# Patient Record
Sex: MALE | Race: WHITE | Employment: OTHER | URBAN - METROPOLITAN AREA
[De-identification: names, ages, dates, MRNs, and addresses within clinical notes are randomized per-mention and may not be internally consistent; named-entity substitution may affect disease eponyms.]

---

## 2019-04-11 ENCOUNTER — OFFICE VISIT (OUTPATIENT)
Dept: FAMILY MEDICINE CLINIC | Age: 84
End: 2019-04-11
Payer: MEDICARE

## 2019-04-11 VITALS
HEIGHT: 72 IN | RESPIRATION RATE: 16 BRPM | WEIGHT: 247 LBS | BODY MASS INDEX: 33.46 KG/M2 | TEMPERATURE: 98.1 F | SYSTOLIC BLOOD PRESSURE: 130 MMHG | DIASTOLIC BLOOD PRESSURE: 78 MMHG

## 2019-04-11 DIAGNOSIS — G89.29 CHRONIC PAIN OF BOTH KNEES: Primary | ICD-10-CM

## 2019-04-11 DIAGNOSIS — M25.562 CHRONIC PAIN OF BOTH KNEES: Primary | ICD-10-CM

## 2019-04-11 DIAGNOSIS — M79.643 CHRONIC HAND PAIN, UNSPECIFIED LATERALITY: ICD-10-CM

## 2019-04-11 DIAGNOSIS — G89.29 CHRONIC HAND PAIN, UNSPECIFIED LATERALITY: ICD-10-CM

## 2019-04-11 DIAGNOSIS — M25.561 CHRONIC PAIN OF BOTH KNEES: Primary | ICD-10-CM

## 2019-04-11 PROCEDURE — 99387 INIT PM E/M NEW PAT 65+ YRS: CPT | Performed by: FAMILY MEDICINE

## 2019-04-11 RX ORDER — AMPICILLIN TRIHYDRATE 250 MG
CAPSULE ORAL
COMMUNITY
End: 2019-06-07 | Stop reason: ALTCHOICE

## 2019-04-11 RX ORDER — METHYLPREDNISOLONE 4 MG/1
4 TABLET ORAL SEE ADMIN INSTRUCTIONS
COMMUNITY
End: 2019-06-07 | Stop reason: ALTCHOICE

## 2019-04-11 RX ORDER — MULTIVITAMIN
1 CAPSULE ORAL DAILY
COMMUNITY

## 2019-04-11 RX ORDER — ATORVASTATIN CALCIUM 80 MG/1
80 TABLET, FILM COATED ORAL DAILY
COMMUNITY
End: 2019-08-15 | Stop reason: SDUPTHER

## 2019-04-11 ASSESSMENT — PATIENT HEALTH QUESTIONNAIRE - PHQ9
1. LITTLE INTEREST OR PLEASURE IN DOING THINGS: 0
SUM OF ALL RESPONSES TO PHQ QUESTIONS 1-9: 0
2. FEELING DOWN, DEPRESSED OR HOPELESS: 0
SUM OF ALL RESPONSES TO PHQ9 QUESTIONS 1 & 2: 0
SUM OF ALL RESPONSES TO PHQ QUESTIONS 1-9: 0

## 2019-04-11 NOTE — PROGRESS NOTES
4/11/2019    This is a 80 y.o. male   Chief Complaint   Patient presents with   [address]   .    HPI  Pt presents today as a new pt to establish a PCP. Currently taking 1/2 of his Lipitor 80 mg. No longer taking medrol dose pack, was prescribed for acute neck pain. Past Medical History:   Diagnosis Date    Herniated cervical disc unknown       Past Surgical History:   Procedure Laterality Date    PACEMAKER INSERTION  unknown       Social History     Socioeconomic History    Marital status:      Spouse name: Not on file    Number of children: Not on file    Years of education: Not on file    Highest education level: Not on file   Occupational History    Not on file   Social Needs    Financial resource strain: Not on file    Food insecurity:     Worry: Not on file     Inability: Not on file    Transportation needs:     Medical: Not on file     Non-medical: Not on file   Tobacco Use    Smoking status: Never Smoker    Smokeless tobacco: Never Used   Substance and Sexual Activity    Alcohol use: Yes     Alcohol/week: 0.6 oz     Types: 1 Cans of beer per week     Comment: drinks every day @ 4pm    Drug use: Never    Sexual activity: Not on file   Lifestyle    Physical activity:     Days per week: Not on file     Minutes per session: Not on file    Stress: Not on file   Relationships    Social connections:     Talks on phone: Not on file     Gets together: Not on file     Attends Restorationism service: Not on file     Active member of club or organization: Not on file     Attends meetings of clubs or organizations: Not on file     Relationship status: Not on file    Intimate partner violence:     Fear of current or ex partner: Not on file     Emotionally abused: Not on file     Physically abused: Not on file     Forced sexual activity: Not on file   Other Topics Concern    Not on file   Social History Narrative    Not on file       History reviewed.  No pertinent Medications    Medication Sig Taking? Authorizing Provider   atorvastatin (LIPITOR) 80 MG tablet Take 80 mg by mouth daily Yes Historical Provider, MD   methylPREDNISolone (MEDROL DOSEPACK) 4 MG tablet Take 4 mg by mouth See Admin Instructions Take by mouth.  Yes Historical Provider, MD   metoprolol tartrate (LOPRESSOR) 25 MG tablet Take 25 mg by mouth 2 times daily Yes Historical Provider, MD   Chondroitin Sulfate 150 MG CAPS Take by mouth Yes Historical Provider, MD   Cinnamon 500 MG CAPS Take by mouth Yes Historical Provider, MD   Multiple Vitamin (MULTIVITAMIN) capsule Take 1 capsule by mouth daily Yes Historical Provider, MD

## 2019-05-29 ENCOUNTER — TELEPHONE (OUTPATIENT)
Dept: FAMILY MEDICINE CLINIC | Age: 84
End: 2019-05-29

## 2019-05-29 NOTE — TELEPHONE ENCOUNTER
[facility] to let Dr. Marlon Harp know that they are starting patient Occupational Therapy For  2 xs  a week For 3 weeks.

## 2019-06-07 ENCOUNTER — OFFICE VISIT (OUTPATIENT)
Dept: FAMILY MEDICINE CLINIC | Age: 84
End: 2019-06-07
Payer: MEDICARE

## 2019-06-07 VITALS
DIASTOLIC BLOOD PRESSURE: 70 MMHG | HEIGHT: 72 IN | BODY MASS INDEX: 32.51 KG/M2 | WEIGHT: 240 LBS | RESPIRATION RATE: 16 BRPM | OXYGEN SATURATION: 94 % | TEMPERATURE: 98.4 F | HEART RATE: 72 BPM | SYSTOLIC BLOOD PRESSURE: 136 MMHG

## 2019-06-07 DIAGNOSIS — I63.9 CEREBROVASCULAR ACCIDENT (CVA), UNSPECIFIED MECHANISM (HCC): Primary | ICD-10-CM

## 2019-06-07 DIAGNOSIS — Z09 HOSPITAL DISCHARGE FOLLOW-UP: ICD-10-CM

## 2019-06-07 PROCEDURE — 1111F DSCHRG MED/CURRENT MED MERGE: CPT | Performed by: FAMILY MEDICINE

## 2019-06-07 PROCEDURE — 99214 OFFICE O/P EST MOD 30 MIN: CPT | Performed by: FAMILY MEDICINE

## 2019-06-07 RX ORDER — TAMSULOSIN HYDROCHLORIDE 0.4 MG/1
0.4 CAPSULE ORAL
COMMUNITY
Start: 2019-05-07 | End: 2019-07-22 | Stop reason: SDUPTHER

## 2019-06-07 RX ORDER — AMOXICILLIN 250 MG
2 CAPSULE ORAL
COMMUNITY

## 2019-06-07 RX ORDER — CHLORAL HYDRATE 500 MG
1000 CAPSULE ORAL
COMMUNITY
Start: 2019-05-23 | End: 2021-03-30 | Stop reason: ALTCHOICE

## 2019-06-07 RX ORDER — CLOPIDOGREL BISULFATE 75 MG/1
TABLET ORAL
COMMUNITY
Start: 2019-05-23 | End: 2019-07-22 | Stop reason: SDUPTHER

## 2019-06-07 ASSESSMENT — ENCOUNTER SYMPTOMS: ABDOMINAL PAIN: 1

## 2019-06-07 NOTE — PROGRESS NOTES
6/7/2019    This is a 80 y.o. male   Chief Complaint   Patient presents with    Follow-Up from Hospital     Released from [address] [place] [address] on 5/7/2019 from having a stroke; did go to rehab and released on 5/23/2019   . HPI  Patient presents today for follow-up from skilled nursing. He was hospitalized from May 1 to May 7 for stroke. He then went to rehabilitation from May 7th and was discharged on May 23, 2019 from [address] [place] [address]. Pt states that he feels well, no previous episodes. Pt's  Daughter in law states that there was no neurology consult while pt was in the Hospital nor rehab. Had Speech/PT/OT therapy while in rehab. Pt states that cardiac event was r/o in the hospital. Dru Moores to some memory issues but denies concentration issue, also admit to improving fatigue,j oint pain, and muscle pain in the buttocks and abdomen, pt was alone at the time of the CVA and does recall any sx before the CVA, was found 1 1/2 days by family. Pt's daughter in law states that he will have a [employer] and cameras in the house. Denies dizziness, HA's, or lightheadedness, or concentration. Past Medical History:   Diagnosis Date    Herniated cervical disc unknown       Past Surgical History:   Procedure Laterality Date    PACEMAKER INSERTION  unknown       Social History     Socioeconomic History    Marital status:      Spouse name: Not on file    Number of children: Not on file    Years of education: Not on file    Highest education level: Not on file   Occupational History    Not on file   Social Needs    Financial resource strain: Not on file    Food insecurity:     Worry: Not on file     Inability: Not on file    Transportation needs:     Medical: Not on file     Non-medical: Not on file   Tobacco Use    Smoking status: Never Smoker    Smokeless tobacco: Never Used   Substance and Sexual Activity    Alcohol use:  Yes     Alcohol/week: 0.6 oz     Types: 1 Cans of beer per week     Comment: drinks every day @ 4pm    Drug use: Never    Sexual activity: Not on file   Lifestyle    Physical activity:     Days per week: Not on file     Minutes per session: Not on file    Stress: Not on file   Relationships    Social connections:     Talks on phone: Not on file     Gets together: Not on file     Attends Zoroastrianism service: Not on file     Active member of club or organization: Not on file     Attends meetings of clubs or organizations: Not on file     Relationship status: Not on file    Intimate partner violence:     Fear of current or ex partner: Not on file     Emotionally abused: Not on file     Physically abused: Not on file     Forced sexual activity: Not on file   Other Topics Concern    Not on file   Social History Narrative    Not on file       History reviewed. No pertinent family history. Current Outpatient Medications   Medication Sig Dispense Refill    clopidogrel (PLAVIX) 75 MG tablet       Omega-3 Fatty Acids (FISH OIL) 1000 MG CAPS Take 1,000 mg by mouth      tamsulosin (FLOMAX) 0.4 MG capsule Take 0.4 mg by mouth      metoprolol tartrate (LOPRESSOR) 25 MG tablet Take 25 mg by mouth      Glucosamine-Chondroitin 250-200 MG TABS Take 2 tablets by mouth      senna-docusate (PERICOLACE) 8.6-50 MG per tablet Take 2 tablets by mouth      atorvastatin (LIPITOR) 80 MG tablet Take 80 mg by mouth daily      metoprolol tartrate (LOPRESSOR) 25 MG tablet Take 25 mg by mouth 2 times daily      Chondroitin Sulfate 150 MG CAPS Take by mouth      Multiple Vitamin (MULTIVITAMIN) capsule Take 1 capsule by mouth daily       No current facility-administered medications for this visit. There is no immunization history on file for this patient. No Known Allergies    No results found for any previous visit. Review of Systems   Constitutional: Positive for fatigue (improved). Gastrointestinal: Positive for abdominal pain (improving).    Musculoskeletal: Positive for arthralgias (improving) and myalgias (improving). Neurological: Negative for dizziness, weakness, light-headedness and headaches. Psychiatric/Behavioral: Negative for confusion and decreased concentration. /70 (Site: Left Upper Arm, Position: Sitting, Cuff Size: Medium Adult)   Pulse 72   Temp 98.4 °F (36.9 °C) (Oral)   Resp 16   Ht 6' (1.829 m)   Wt 240 lb (108.9 kg)   SpO2 94%   BMI 32.55 kg/m²     Physical Exam   Constitutional: He is oriented to person, place, and time. He appears well-developed and well-nourished. No distress. HENT:   Head: Normocephalic and atraumatic. Eyes: Pupils are equal, round, and reactive to light. EOM are normal.   Neck: Normal range of motion. Cardiovascular: Normal rate, regular rhythm and normal heart sounds. Pulmonary/Chest: Effort normal and breath sounds normal. He has no wheezes. Abdominal: Bowel sounds are normal. There is no tenderness. Neurological: He is alert and oriented to person, place, and time. No cranial nerve deficit or sensory deficit. Coordination normal.   Psychiatric: He has a normal mood and affect. His behavior is normal. Judgment and thought content normal.   Vitals reviewed. Plan   Diagnosis Orders   1. Cerebrovascular accident (CVA), unspecified mechanism ([facility] [address]  Sheri 5, [facility]   2. Hospital discharge follow-up  [address] meds as prescribed. Return in about 2 months (around 8/7/2019) for CVA/Neurology F/U. Prior to Visit Medications    Medication Sig Taking?  Authorizing Provider   clopidogrel (PLAVIX) 75 MG tablet  Yes Historical Provider, MD   Omega-3 Fatty Acids (FISH OIL) 1000 MG CAPS Take 1,000 mg by mouth Yes Historical Provider, MD   tamsulosin (FLOMAX) 0.4 MG capsule Take 0.4 mg by mouth Yes Historical Provider, MD   metoprolol tartrate (LOPRESSOR) 25 MG tablet Take 25 mg by mouth Yes Historical Provider, MD Glucosamine-Chondroitin 250-200 MG TABS Take 2 tablets by mouth Yes Historical Provider, MD   senna-docusate ([place]) 8.6-50 MG per tablet Take 2 tablets by mouth Yes Historical Provider, MD   atorvastatin (LIPITOR) 80 MG tablet Take 80 mg by mouth daily Yes Historical Provider, MD   metoprolol tartrate (LOPRESSOR) 25 MG tablet Take 25 mg by mouth 2 times daily Yes Historical Provider, MD   Chondroitin Sulfate 150 MG CAPS Take by mouth Yes Historical Provider, MD   Multiple Vitamin (MULTIVITAMIN) capsule Take 1 capsule by mouth daily Yes Historical Provider, MD

## 2019-06-10 PROBLEM — G93.9 DISORDER OF BRAIN: Status: ACTIVE | Noted: 2019-05-02

## 2019-06-10 PROBLEM — G45.8 TRANSIENT ISCHEMIC ATTACK, ANTERIOR CIRCULATION, ACUTE: Status: ACTIVE | Noted: 2019-05-04

## 2019-06-10 PROBLEM — M62.82 RHABDOMYOLYSIS: Status: ACTIVE | Noted: 2019-05-02

## 2019-06-10 RX ORDER — M-VIT,TX,IRON,MINS/CALC/FOLIC 27MG-0.4MG
1 TABLET ORAL
COMMUNITY
End: 2019-06-12 | Stop reason: SDUPTHER

## 2019-06-10 RX ORDER — ST. JOHN'S WORT 300 MG
200 CAPSULE ORAL
COMMUNITY

## 2019-06-10 RX ORDER — ASPIRIN 81 MG/1
81 TABLET, CHEWABLE ORAL
COMMUNITY
Start: 2019-05-08

## 2019-06-10 RX ORDER — VIT C/B6/B5/MAGNESIUM/HERB 173 50-5-6-5MG
500 CAPSULE ORAL
COMMUNITY

## 2019-06-10 RX ORDER — AMPICILLIN TRIHYDRATE 250 MG
2000 CAPSULE ORAL
COMMUNITY

## 2019-06-12 ENCOUNTER — OFFICE VISIT (OUTPATIENT)
Dept: FAMILY MEDICINE CLINIC | Age: 84
End: 2019-06-12
Payer: MEDICARE

## 2019-06-12 VITALS
DIASTOLIC BLOOD PRESSURE: 70 MMHG | RESPIRATION RATE: 14 BRPM | HEART RATE: 82 BPM | SYSTOLIC BLOOD PRESSURE: 106 MMHG | BODY MASS INDEX: 32.23 KG/M2 | HEIGHT: 72 IN | OXYGEN SATURATION: 94 % | WEIGHT: 238 LBS

## 2019-06-12 DIAGNOSIS — Z00.00 ROUTINE GENERAL MEDICAL EXAMINATION AT A HEALTH CARE FACILITY: Primary | ICD-10-CM

## 2019-06-12 DIAGNOSIS — Z23 NEED FOR PROPHYLACTIC VACCINATION AGAINST DIPHTHERIA-TETANUS-PERTUSSIS (DTP): ICD-10-CM

## 2019-06-12 DIAGNOSIS — Z23 NEED FOR PROPHYLACTIC VACCINATION AGAINST STREPTOCOCCUS PNEUMONIAE (PNEUMOCOCCUS): ICD-10-CM

## 2019-06-12 DIAGNOSIS — Z23 NEED FOR PROPHYLACTIC VACCINATION AND INOCULATION AGAINST VARICELLA: ICD-10-CM

## 2019-06-12 PROCEDURE — G0402 INITIAL PREVENTIVE EXAM: HCPCS | Performed by: FAMILY MEDICINE

## 2019-06-12 ASSESSMENT — PATIENT HEALTH QUESTIONNAIRE - PHQ9
SUM OF ALL RESPONSES TO PHQ QUESTIONS 1-9: 0
SUM OF ALL RESPONSES TO PHQ QUESTIONS 1-9: 0

## 2019-06-12 ASSESSMENT — LIFESTYLE VARIABLES: HOW OFTEN DO YOU HAVE A DRINK CONTAINING ALCOHOL: 0

## 2019-06-12 ASSESSMENT — ANXIETY QUESTIONNAIRES: GAD7 TOTAL SCORE: 0

## 2019-06-12 NOTE — PATIENT INSTRUCTIONS
Personalized Preventive Plan for [facility] - 6/12/2019  Medicare offers a range of preventive health benefits. Some of the tests and screenings are paid in full while other may be subject to a deductible, co-insurance, and/or copay. Some of these benefits include a comprehensive review of your medical history including lifestyle, illnesses that may run in your family, and various assessments and screenings as appropriate. After reviewing your medical record and screening and assessments performed today your provider may have ordered immunizations, labs, imaging, and/or referrals for you. A list of these orders (if applicable) as well as your Preventive Care list are included within your After Visit Summary for your review. Other Preventive Recommendations:    · A preventive eye exam performed by an eye specialist is recommended every 1-2 years to screen for glaucoma; cataracts, macular degeneration, and other eye disorders. · A preventive dental visit is recommended every 6 months. · Try to get at least 150 minutes of exercise per week or 10,000 steps per day on a pedometer . · Order or download the FREE \"Exercise & Physical Activity: Your Everyday Guide\" from The [employer]. Call 6-989.642.1498 or search The [employer] online. · You need 9014-5203 mg of calcium and 1747-1306 IU of vitamin D per day. It is possible to meet your calcium requirement with diet alone, but a vitamin D supplement is usually necessary to meet this goal.  · When exposed to the sun, use a sunscreen that protects against both UVA and UVB radiation with an SPF of 30 or greater. Reapply every 2 to 3 hours or after sweating, drying off with a towel, or swimming. · Always wear a seat belt when traveling in a car. Always wear a helmet when riding a bicycle or motorcycle.

## 2019-06-12 NOTE — PROGRESS NOTES
Medicare Annual Wellness Visit  Name: Peter Bland Date: 2019   MRN: [id number] Sex: Male   Age: 80 y.o. Ethnicity: Non-/Non    : 1935 Race: Jackelin Radford is here for Medicare AWV    Screenings for behavioral, psychosocial and functional/safety risks, and cognitive dysfunction are all negative except as indicated below. These results, as well as other patient data from the [address] form, are documented in Flowsheets linked to this Encounter. No Known Allergies  Prior to Visit Medications    Medication Sig Taking?  Authorizing Provider   aspirin 81 MG chewable tablet Take 81 mg by mouth Yes Historical Provider, MD   clopidogrel (PLAVIX) 75 MG tablet  Yes Historical Provider, MD   Omega-3 Fatty Acids (FISH OIL) 1000 MG CAPS Take 1,000 mg by mouth Yes Historical Provider, MD   tamsulosin (FLOMAX) 0.4 MG capsule Take 0.4 mg by mouth Yes Historical Provider, MD   Glucosamine-Chondroitin 250-200 MG TABS Take 2 tablets by mouth Yes Historical Provider, MD   senna-docusate (PERICOLACE) 8.6-50 MG per tablet Take 2 tablets by mouth Yes Historical Provider, MD   atorvastatin (LIPITOR) 80 MG tablet Take 80 mg by mouth daily Yes Historical Provider, MD   metoprolol tartrate (LOPRESSOR) 25 MG tablet Take 25 mg by mouth 2 times daily Yes Historical Provider, MD   Multiple Vitamin (MULTIVITAMIN) capsule Take 1 capsule by mouth daily Yes Historical Provider, MD   Turmeric 500 MG CAPS Take 500 mg by mouth  Historical Provider, MD   Cinnamon 500 MG CAPS Take 2,000 mg by mouth  Historical Provider, MD   Alpha Lipoic Acid 200 MG CAPS Take 200 mg by mouth  Historical Provider, MD   Chondroitin Sulfate 150 MG CAPS Take by mouth  Historical Provider, MD     Past Medical History:   Diagnosis Date    Herniated cervical disc unknown     Past Surgical History:   Procedure Laterality Date    HERNIA REPAIR      PACEMAKER INSERTION  unknown    PROSTATE SURGERY       History

## 2019-06-18 ENCOUNTER — TELEPHONE (OUTPATIENT)
Dept: FAMILY MEDICINE CLINIC | Age: 84
End: 2019-06-18

## 2019-06-26 ENCOUNTER — TELEPHONE (OUTPATIENT)
Dept: FAMILY MEDICINE CLINIC | Age: 84
End: 2019-06-26

## 2019-06-26 DIAGNOSIS — I63.9 CEREBROVASCULAR ACCIDENT (CVA), UNSPECIFIED MECHANISM (HCC): Primary | ICD-10-CM

## 2019-06-26 NOTE — TELEPHONE ENCOUNTER
Pt's daughter, Mikey Urbina, called to ask how they go about pt driving again. She states it needs to come from his PCP.     Please advise

## 2019-07-22 NOTE — TELEPHONE ENCOUNTER
6/12/2019      No future appointments. Patient will be using [facility] only for this script then they will send these to [facility].  Please make sure you send to [facility]

## 2019-07-23 RX ORDER — CLOPIDOGREL BISULFATE 75 MG/1
75 TABLET ORAL DAILY
Qty: 30 TABLET | Refills: 1 | Status: SHIPPED | OUTPATIENT
Start: 2019-07-23 | End: 2019-08-22 | Stop reason: ALTCHOICE

## 2019-07-23 RX ORDER — TAMSULOSIN HYDROCHLORIDE 0.4 MG/1
0.4 CAPSULE ORAL DAILY
Qty: 30 CAPSULE | Refills: 1 | Status: SHIPPED | OUTPATIENT
Start: 2019-07-23 | End: 2019-08-15 | Stop reason: SDUPTHER

## 2019-07-30 ENCOUNTER — TELEPHONE (OUTPATIENT)
Dept: FAMILY MEDICINE CLINIC | Age: 84
End: 2019-07-30

## 2019-07-30 NOTE — TELEPHONE ENCOUNTER
Kia from [facility] called back and is needing a verbal okay to continue speech therapy. Please advise.

## 2019-07-30 NOTE — TELEPHONE ENCOUNTER
On Call note: 07/30/19 @ 17:30  Received a call from Linette Aburto from [facility] stating that earlier she requested a verbal order to continue pt's therapy, however, the verbal was for occupational therapy and she needs a verbal for speech therapy. I spoke with her and gave verbal for speech therapy. She states that pt is still having some aphasia.

## 2019-08-15 RX ORDER — ATORVASTATIN CALCIUM 80 MG/1
80 TABLET, FILM COATED ORAL DAILY
Qty: 90 TABLET | Refills: 1 | Status: SHIPPED | OUTPATIENT
Start: 2019-08-15 | End: 2019-08-22 | Stop reason: SDUPTHER

## 2019-08-15 RX ORDER — TAMSULOSIN HYDROCHLORIDE 0.4 MG/1
0.4 CAPSULE ORAL DAILY
Qty: 90 CAPSULE | Refills: 1 | Status: SHIPPED | OUTPATIENT
Start: 2019-08-15 | End: 2020-02-18 | Stop reason: SDUPTHER

## 2019-08-22 ENCOUNTER — TELEPHONE (OUTPATIENT)
Dept: FAMILY MEDICINE CLINIC | Age: 84
End: 2019-08-22

## 2019-08-22 ENCOUNTER — OFFICE VISIT (OUTPATIENT)
Dept: FAMILY MEDICINE CLINIC | Age: 84
End: 2019-08-22
Payer: MEDICARE

## 2019-08-22 VITALS
WEIGHT: 229 LBS | HEART RATE: 59 BPM | SYSTOLIC BLOOD PRESSURE: 140 MMHG | DIASTOLIC BLOOD PRESSURE: 78 MMHG | BODY MASS INDEX: 31.02 KG/M2 | RESPIRATION RATE: 14 BRPM | OXYGEN SATURATION: 94 % | HEIGHT: 72 IN

## 2019-08-22 DIAGNOSIS — I63.9 CEREBROVASCULAR ACCIDENT (CVA), UNSPECIFIED MECHANISM (HCC): Primary | ICD-10-CM

## 2019-08-22 PROCEDURE — 99213 OFFICE O/P EST LOW 20 MIN: CPT | Performed by: FAMILY MEDICINE

## 2019-08-22 RX ORDER — ATORVASTATIN CALCIUM 20 MG/1
20 TABLET, FILM COATED ORAL DAILY
Qty: 90 TABLET | Refills: 1 | Status: SHIPPED | OUTPATIENT
Start: 2019-08-22 | End: 2020-02-18 | Stop reason: SDUPTHER

## 2019-08-22 NOTE — PROGRESS NOTES
8/22/2019    This is a 80 y.o. male   Chief Complaint   Patient presents with    Cerebrovascular Accident     Follow up   . HPI  Patient presents today for a follow-up visit. He had a CVA and was hospitalized at [facility] from 5/1/19 - 5/7/19. He followed up with rehab and was released on May 23, 2019. States that she finished his driving evaluation, [facility] did initial evaluation and recommended an in-care exam. States that he did this yesterday. Was given a form that he doesn't have today. States that he was told to exclude highway driving but that pt could drive otherwise. Evaluator told pt that he drives too close to the side and was not impressed with the evaluator. Currently driving and states that he has no issues. Also is upset that [facility] wants additional money from his despite his insurance paying for his hospital bills. States that he is feeling great, admits to stress from his recent bills but he is working on that. Denies dizziness or HA's. Denies issues with ADL's or taking medications. Also states that he was prescribed medication, calls it a \"regular problem\" (ASA), states that his Plavix was stopped, denies issues with taking his medications as prescribed, states that his daughter-in-law puts his medications in labeled cups and that this working out ok, states that he can now do this by himself. Past Medical History:   Diagnosis Date    Herniated cervical disc unknown       Past Surgical History:   Procedure Laterality Date    HERNIA REPAIR      PACEMAKER INSERTION  unknown    PROSTATE SURGERY         Social History     Socioeconomic History    Marital status:       Spouse name: Not on file    Number of children: Not on file    Years of education: Not on file    Highest education level: Not on file   Occupational History    Not on file   Social Needs    Financial resource strain: Not on file    Food insecurity:     Worry: Not on file     Inability:

## 2019-08-22 NOTE — TELEPHONE ENCOUNTER
Daughter Zen Magana called stating we sent Lipitor 80mg to the Pharmacy but she states he is supposed to be taking 20mg. Please advise. Also note, Zen Magana is not on HIPAA.

## 2020-02-18 RX ORDER — TAMSULOSIN HYDROCHLORIDE 0.4 MG/1
0.4 CAPSULE ORAL DAILY
Qty: 90 CAPSULE | Refills: 1 | Status: SHIPPED | OUTPATIENT
Start: 2020-02-18 | End: 2020-08-22 | Stop reason: SDUPTHER

## 2020-02-18 RX ORDER — ATORVASTATIN CALCIUM 20 MG/1
20 TABLET, FILM COATED ORAL DAILY
Qty: 90 TABLET | Refills: 1 | Status: SHIPPED | OUTPATIENT
Start: 2020-02-18 | End: 2020-08-21

## 2020-02-18 NOTE — TELEPHONE ENCOUNTER
Patient needs a refill on metoprolol tartrate (LOPRESSOR) 25 MG tablet  atorvastatin (LIPITOR) 20 MG tablet  tamsulosin (FLOMAX) 0.4 MG capsule         . They need a 90  day supply.          Pharmacy: [facility]

## 2020-06-10 ENCOUNTER — OFFICE VISIT (OUTPATIENT)
Dept: FAMILY MEDICINE CLINIC | Age: 85
End: 2020-06-10
Payer: MEDICARE

## 2020-06-10 VITALS
TEMPERATURE: 96.5 F | OXYGEN SATURATION: 96 % | BODY MASS INDEX: 31.33 KG/M2 | RESPIRATION RATE: 18 BRPM | DIASTOLIC BLOOD PRESSURE: 86 MMHG | WEIGHT: 231 LBS | SYSTOLIC BLOOD PRESSURE: 142 MMHG | HEART RATE: 65 BPM

## 2020-06-10 PROCEDURE — 99213 OFFICE O/P EST LOW 20 MIN: CPT | Performed by: NURSE PRACTITIONER

## 2020-06-10 RX ORDER — NYSTATIN 100000 U/G
CREAM TOPICAL
Qty: 60 G | Refills: 0 | Status: SHIPPED | OUTPATIENT
Start: 2020-06-10

## 2020-06-10 ASSESSMENT — ENCOUNTER SYMPTOMS
CONSTIPATION: 0
CHEST TIGHTNESS: 0
NAUSEA: 0
ABDOMINAL PAIN: 0
COUGH: 0
BLOOD IN STOOL: 0
DIARRHEA: 0
RECTAL PAIN: 1
VOMITING: 0

## 2020-06-10 ASSESSMENT — PATIENT HEALTH QUESTIONNAIRE - PHQ9
SUM OF ALL RESPONSES TO PHQ QUESTIONS 1-9: 0
SUM OF ALL RESPONSES TO PHQ9 QUESTIONS 1 & 2: 0
SUM OF ALL RESPONSES TO PHQ QUESTIONS 1-9: 0
2. FEELING DOWN, DEPRESSED OR HOPELESS: 0
1. LITTLE INTEREST OR PLEASURE IN DOING THINGS: 0

## 2020-06-10 NOTE — PROGRESS NOTES
 tamsulosin (FLOMAX) 0.4 MG capsule Take 1 capsule by mouth daily 90 capsule 1    Omega-3 Fatty Acids (FISH OIL) 1000 MG CAPS Take 1,000 mg by mouth       No current facility-administered medications for this visit. No Known Allergies    BP (!) 142/86 (Site: Left Upper Arm, Position: Sitting)   Pulse 65   Temp 96.5 °F (35.8 °C) (Infrared)   Resp 18   Wt 231 lb (104.8 kg)   SpO2 96%   BMI 31.33 kg/m²     Social History     Tobacco Use    Smoking status: Never Smoker    Smokeless tobacco: Never Used   Substance Use Topics    Alcohol use: Yes     Alcohol/week: 1.0 standard drinks     Types: 1 Cans of beer per week     Comment: drinks every day @ 4pm       Review of Systems   Constitutional: Negative for chills and fever. HENT: Negative. Respiratory: Negative for cough and chest tightness. Cardiovascular: Negative for chest pain, palpitations and leg swelling. Gastrointestinal: Positive for rectal pain. Negative for abdominal pain, blood in stool, constipation, diarrhea, nausea and vomiting. Musculoskeletal: Negative. Skin: Positive for wound. Neurological: Negative. Hematological: Negative. Physical Exam  Vitals signs and nursing note reviewed. Constitutional:       General: He is not in acute distress. Appearance: He is well-developed. He is not ill-appearing or diaphoretic. HENT:      Head: Normocephalic and atraumatic. Right Ear: External ear normal.      Left Ear: External ear normal.      Nose: Nose normal.      Mouth/Throat:      Mouth: Mucous membranes are moist.      Pharynx: Oropharynx is clear. No oropharyngeal exudate. Eyes:      General:         Right eye: No discharge. Left eye: No discharge. Conjunctiva/sclera: Conjunctivae normal.   Neck:      Musculoskeletal: Normal range of motion and neck supple. Cardiovascular:      Rate and Rhythm: Normal rate and regular rhythm. Heart sounds: Normal heart sounds. No murmur.  No friction rub. No gallop. Pulmonary:      Effort: Pulmonary effort is normal. No respiratory distress. Breath sounds: Normal breath sounds. No wheezing or rales. Abdominal:      General: Bowel sounds are normal. There is no distension. Palpations: Abdomen is soft. Tenderness: There is no abdominal tenderness. Genitourinary:     Rectum: No tenderness or anal fissure. Musculoskeletal: Normal range of motion. General: No tenderness. Lymphadenopathy:      Cervical: No cervical adenopathy. Skin:     General: Skin is warm and dry. Coloration: Skin is not pale. Findings: No erythema or rash. Neurological:      Mental Status: He is alert and oriented to person, place, and time. Motor: No abnormal muscle tone. Coordination: Coordination normal.   Psychiatric:         Behavior: Behavior normal.         Thought Content: Thought content normal.         Judgment: Judgment normal.         Diagnosis       ICD-10-CM    1. Intertrigo L30.4 nystatin (MYCOSTATIN) 235777 UNIT/GM cream        Plan    Start nystatin cream bid to gluteal folds  Wash and dry well  Report if fails to improve  Would likely benefit from barrier cream to prevent reoccurance    No orders of the defined types were placed in this encounter. Orders Placed This Encounter   Medications    nystatin (MYCOSTATIN) 286208 UNIT/GM cream     Sig: Apply topically 2 times daily to gluteal folds     Dispense:  60 g     Refill:  0       Patient Education:  Intertrigo care    Return if symptoms worsen or fail to improve.

## 2020-08-21 RX ORDER — ATORVASTATIN CALCIUM 20 MG/1
TABLET, FILM COATED ORAL
Qty: 90 TABLET | Refills: 1 | Status: SHIPPED | OUTPATIENT
Start: 2020-08-21 | End: 2021-02-11

## 2020-08-21 NOTE — TELEPHONE ENCOUNTER
Patient needs a refill on tamsulosin (FLOMAX) 0.4 MG capsule    . They need a 90 day supply.        Pharmacy: [facility] #[phone]

## 2020-08-22 RX ORDER — TAMSULOSIN HYDROCHLORIDE 0.4 MG/1
0.4 CAPSULE ORAL DAILY
Qty: 90 CAPSULE | Refills: 1 | Status: SHIPPED | OUTPATIENT
Start: 2020-08-22 | End: 2021-02-11

## 2020-10-13 ENCOUNTER — OFFICE VISIT (OUTPATIENT)
Dept: FAMILY MEDICINE CLINIC | Age: 85
End: 2020-10-13
Payer: MEDICARE

## 2020-10-13 VITALS
DIASTOLIC BLOOD PRESSURE: 98 MMHG | SYSTOLIC BLOOD PRESSURE: 140 MMHG | TEMPERATURE: 98.9 F | WEIGHT: 237 LBS | HEART RATE: 61 BPM | HEIGHT: 68 IN | BODY MASS INDEX: 35.92 KG/M2 | RESPIRATION RATE: 16 BRPM | OXYGEN SATURATION: 96 %

## 2020-10-13 PROCEDURE — 99213 OFFICE O/P EST LOW 20 MIN: CPT | Performed by: FAMILY MEDICINE

## 2020-10-13 ASSESSMENT — ENCOUNTER SYMPTOMS: EYE PAIN: 1

## 2020-10-13 ASSESSMENT — PATIENT HEALTH QUESTIONNAIRE - PHQ9
1. LITTLE INTEREST OR PLEASURE IN DOING THINGS: 0
SUM OF ALL RESPONSES TO PHQ9 QUESTIONS 1 & 2: 0
2. FEELING DOWN, DEPRESSED OR HOPELESS: 0
SUM OF ALL RESPONSES TO PHQ QUESTIONS 1-9: 0
SUM OF ALL RESPONSES TO PHQ QUESTIONS 1-9: 0

## 2020-10-13 NOTE — PROGRESS NOTES
10/13/2020    This is a 80 y.o. male   Chief Complaint   Patient presents with    Eye Problem     R eye x couple weeks, something in it, feels like a needle when he touches it   . HPI  Patient presents today for what feels like a foreign body in his eye when he touches it. Symptoms present for the last 2 days and patient denies previous episodes, trauma, or known triggers. Has been using erythromycin ointment 0.5%. Past Medical History:   Diagnosis Date    Herniated cervical disc unknown       Past Surgical History:   Procedure Laterality Date    HERNIA REPAIR      PACEMAKER INSERTION  unknown    PROSTATE SURGERY         Social History     Socioeconomic History    Marital status:      Spouse name: Not on file    Number of children: Not on file    Years of education: Not on file    Highest education level: Not on file   Occupational History    Not on file   Social Needs    Financial resource strain: Not on file    Food insecurity     Worry: Not on file     Inability: Not on file    Transportation needs     Medical: Not on file     Non-medical: Not on file   Tobacco Use    Smoking status: Never Smoker    Smokeless tobacco: Never Used   Substance and Sexual Activity    Alcohol use:  Yes     Alcohol/week: 1.0 standard drinks     Types: 1 Cans of beer per week     Comment: drinks every day @ 4pm    Drug use: Never    Sexual activity: Not Currently   Lifestyle    Physical activity     Days per week: Not on file     Minutes per session: Not on file    Stress: Not on file   Relationships    Social connections     Talks on phone: Not on file     Gets together: Not on file     Attends Yazidi service: Not on file     Active member of club or organization: Not on file     Attends meetings of clubs or organizations: Not on file     Relationship status: Not on file    Intimate partner violence     Fear of current or ex partner: Not on file     Emotionally abused: Not on file     Physically abused: Not on file     Forced sexual activity: Not on file   Other Topics Concern    Not on file   Social History Narrative    Not on file       History reviewed. No pertinent family history. Current Outpatient Medications   Medication Sig Dispense Refill    tamsulosin (FLOMAX) 0.4 MG capsule Take 1 capsule by mouth daily 90 capsule 1    metoprolol tartrate (LOPRESSOR) 25 MG tablet TAKE 1 TABLET TWICE A  tablet 1    atorvastatin (LIPITOR) 20 MG tablet TAKE 1 TABLET DAILY EVERY  NIGHT 90 tablet 1    nystatin (MYCOSTATIN) 683314 UNIT/GM cream Apply topically 2 times daily to gluteal folds 60 g 0    Turmeric 500 MG CAPS Take 500 mg by mouth      Cinnamon 500 MG CAPS Take 2,000 mg by mouth      aspirin 81 MG chewable tablet Take 81 mg by mouth      Alpha Lipoic Acid 200 MG CAPS Take 200 mg by mouth      Omega-3 Fatty Acids (FISH OIL) 1000 MG CAPS Take 1,000 mg by mouth      Glucosamine-Chondroitin 250-200 MG TABS Take 2 tablets by mouth      senna-docusate (PERICOLACE) 8.6-50 MG per tablet Take 2 tablets by mouth      Chondroitin Sulfate 150 MG CAPS Take by mouth      Multiple Vitamin (MULTIVITAMIN) capsule Take 1 capsule by mouth daily       No current facility-administered medications for this visit. There is no immunization history for the selected administration types on file for this patient. No Known Allergies    No results found for any previous visit. Review of Systems   Eyes: Positive for pain. BP (!) 140/98 (Site: Right Upper Arm, Position: Sitting)   Pulse 61   Temp 98.9 °F (37.2 °C) (Temporal)   Resp 16   Ht 5' 8\" (1.727 m)   Wt 237 lb (107.5 kg)   SpO2 96%   BMI 36.04 kg/m²     Physical Exam  Vitals signs reviewed. Exam conducted with a chaperone present. Constitutional:       Appearance: He is well-developed. HENT:      Head: Normocephalic and atraumatic. Eyes:      General:         Right eye: No discharge. Left eye: No discharge. Pupils: Pupils are equal, round, and reactive to light. Comments: 0.25 cm raised erythemic area on ridge of central upper eyelid, no foreign body observed   Neck:      Musculoskeletal: Normal range of motion. Cardiovascular:      Rate and Rhythm: Normal rate and regular rhythm. Heart sounds: Normal heart sounds. Pulmonary:      Effort: Pulmonary effort is normal.      Breath sounds: Normal breath sounds. Abdominal:      General: Bowel sounds are normal.   Neurological:      Mental Status: He is alert and oriented to person, place, and time. Psychiatric:         Behavior: Behavior normal.         Thought Content: Thought content normal.         Judgment: Judgment normal.         Plan   Diagnosis Orders   1. Hordeolum externum of right upper eyelid       Right eye flushed with ophthalmic rinsing solution. Patient instructed to continue applying erythromycin ointment to the area and to use warm compresses every 15 minutes on the eyelid. Patient also instructed to call the clinic in 2 days with a status. Return if symptoms worsen or fail to improve. Prior to Visit Medications    Medication Sig Taking?  Authorizing Provider   tamsulosin (FLOMAX) 0.4 MG capsule Take 1 capsule by mouth daily Yes Marsha Garcia,    metoprolol tartrate (LOPRESSOR) 25 MG tablet TAKE 1 TABLET TWICE A DAY Yes Linette Pelaez DO   atorvastatin (LIPITOR) 20 MG tablet TAKE 1 TABLET DAILY EVERY  NIGHT Yes Marsha Garcia, DO   nystatin (MYCOSTATIN) 509187 UNIT/GM cream Apply topically 2 times daily to gluteal folds Yes JEANINE West - CNP   Turmeric 500 MG CAPS Take 500 mg by mouth Yes Historical Provider, MD   Cinnamon 500 MG CAPS Take 2,000 mg by mouth Yes Historical Provider, MD   aspirin 81 MG chewable tablet Take 81 mg by mouth Yes Historical Provider, MD   Alpha Lipoic Acid 200 MG CAPS Take 200 mg by mouth Yes Historical Provider, MD   Omega-3 Fatty Acids (FISH OIL) 1000 MG CAPS Take 1,000 mg by mouth Yes Historical Provider, MD   Glucosamine-Chondroitin 250-200 MG TABS Take 2 tablets by mouth Yes Historical Provider, MD   senna-docusate (Madyson Rudd) 8.6-50 MG per tablet Take 2 tablets by mouth Yes Historical Provider, MD   Chondroitin Sulfate 150 MG CAPS Take by mouth Yes Historical Provider, MD   Multiple Vitamin (MULTIVITAMIN) capsule Take 1 capsule by mouth daily Yes Historical Provider, MD

## 2021-02-11 RX ORDER — ATORVASTATIN CALCIUM 20 MG/1
TABLET, FILM COATED ORAL
Qty: 90 TABLET | Refills: 1 | Status: SHIPPED | OUTPATIENT
Start: 2021-02-11 | End: 2021-05-06 | Stop reason: SDUPTHER

## 2021-03-30 ENCOUNTER — OFFICE VISIT (OUTPATIENT)
Dept: FAMILY MEDICINE CLINIC | Age: 86
End: 2021-03-30
Payer: COMMERCIAL

## 2021-03-30 VITALS
RESPIRATION RATE: 16 BRPM | HEART RATE: 69 BPM | BODY MASS INDEX: 36.34 KG/M2 | TEMPERATURE: 98.9 F | WEIGHT: 239 LBS | DIASTOLIC BLOOD PRESSURE: 88 MMHG | OXYGEN SATURATION: 97 % | SYSTOLIC BLOOD PRESSURE: 158 MMHG

## 2021-03-30 DIAGNOSIS — M54.50 CHRONIC RIGHT-SIDED LOW BACK PAIN WITHOUT SCIATICA: Primary | ICD-10-CM

## 2021-03-30 DIAGNOSIS — G89.29 CHRONIC RIGHT-SIDED LOW BACK PAIN WITHOUT SCIATICA: Primary | ICD-10-CM

## 2021-03-30 PROCEDURE — 99213 OFFICE O/P EST LOW 20 MIN: CPT | Performed by: FAMILY MEDICINE

## 2021-03-30 RX ORDER — LIDOCAINE AND PRILOCAINE 25; 25 MG/G; MG/G
CREAM TOPICAL
Qty: 1 TUBE | Refills: 1 | Status: SHIPPED | OUTPATIENT
Start: 2021-03-30

## 2021-03-30 RX ORDER — METHYLPREDNISOLONE 4 MG/1
TABLET ORAL
Qty: 1 KIT | Refills: 0 | Status: SHIPPED | OUTPATIENT
Start: 2021-03-30 | End: 2021-04-05

## 2021-03-30 RX ORDER — TIZANIDINE 2 MG/1
2 TABLET ORAL 3 TIMES DAILY PRN
Qty: 30 TABLET | Refills: 0 | Status: SHIPPED | OUTPATIENT
Start: 2021-03-30

## 2021-03-30 ASSESSMENT — PATIENT HEALTH QUESTIONNAIRE - PHQ9
2. FEELING DOWN, DEPRESSED OR HOPELESS: 0
SUM OF ALL RESPONSES TO PHQ9 QUESTIONS 1 & 2: 0
SUM OF ALL RESPONSES TO PHQ QUESTIONS 1-9: 0

## 2021-03-30 ASSESSMENT — ENCOUNTER SYMPTOMS: BACK PAIN: 1

## 2021-03-30 NOTE — PROGRESS NOTES
3/30/2021    This is a 80 y.o. male   Chief Complaint   Patient presents with    Lower Back Pain     L3-L4-L5 no disc space - last 6 months worse   . HPI  Patient presents today for low back pain that is worsened in the last 6 months. BP elevated today, States that his home BP readings avg 132-135-80's. Admits to a 20 year hx of back pain. No previous surgery, had injections which didn't help, pain now radiating laterally, most right sided, denies sciatica, moving makes it worse, pain is daily, described as sharp, sometimes painful at night when he rolls over on it. Past Medical History:   Diagnosis Date    Herniated cervical disc unknown       Past Surgical History:   Procedure Laterality Date    HERNIA REPAIR      PACEMAKER INSERTION  unknown    PROSTATE SURGERY         Social History     Socioeconomic History    Marital status:      Spouse name: Not on file    Number of children: Not on file    Years of education: Not on file    Highest education level: Not on file   Occupational History    Not on file   Social Needs    Financial resource strain: Not on file    Food insecurity     Worry: Not on file     Inability: Not on file    Transportation needs     Medical: Not on file     Non-medical: Not on file   Tobacco Use    Smoking status: Never Smoker    Smokeless tobacco: Never Used   Substance and Sexual Activity    Alcohol use:  Yes     Alcohol/week: 1.0 standard drinks     Types: 1 Cans of beer per week     Comment: drinks every day @ 4pm    Drug use: Never    Sexual activity: Not Currently   Lifestyle    Physical activity     Days per week: Not on file     Minutes per session: Not on file    Stress: Not on file   Relationships    Social connections     Talks on phone: Not on file     Gets together: Not on file     Attends Episcopal service: Not on file     Active member of club or organization: Not on file     Attends meetings of clubs or organizations: Not on file Relationship status: Not on file    Intimate partner violence     Fear of current or ex partner: Not on file     Emotionally abused: Not on file     Physically abused: Not on file     Forced sexual activity: Not on file   Other Topics Concern    Not on file   Social History Narrative    Not on file       History reviewed. No pertinent family history. Current Outpatient Medications   Medication Sig Dispense Refill    methylPREDNISolone (MEDROL DOSEPACK) 4 MG tablet Take by mouth. 1 kit 0    tiZANidine (ZANAFLEX) 2 MG tablet Take 1 tablet by mouth 3 times daily as needed (prn) 30 tablet 0    lidocaine-prilocaine (EMLA) 2.5-2.5 % cream Apply topically as needed. 1 Tube 1    atorvastatin (LIPITOR) 20 MG tablet TAKE 1 TABLET DAILY EVERY  NIGHT 90 tablet 1    metoprolol tartrate (LOPRESSOR) 25 MG tablet TAKE 1 TABLET TWICE A  tablet 1    tamsulosin (FLOMAX) 0.4 MG capsule TAKE ONE CAPSULE BY MOUTH DAILY 90 capsule 1    nystatin (MYCOSTATIN) 789817 UNIT/GM cream Apply topically 2 times daily to gluteal folds 60 g 0    Turmeric 500 MG CAPS Take 500 mg by mouth      Cinnamon 500 MG CAPS Take 2,000 mg by mouth      aspirin 81 MG chewable tablet Take 81 mg by mouth      Alpha Lipoic Acid 200 MG CAPS Take 200 mg by mouth      Glucosamine-Chondroitin 250-200 MG TABS Take 2 tablets by mouth      senna-docusate (PERICOLACE) 8.6-50 MG per tablet Take 2 tablets by mouth      Chondroitin Sulfate 150 MG CAPS Take by mouth      Multiple Vitamin (MULTIVITAMIN) capsule Take 1 capsule by mouth daily       No current facility-administered medications for this visit. Immunization History   Administered Date(s) Administered    COVID-19, Pfizer, PF, 30mcg/0.3mL 02/27/2021, 03/20/2021       No Known Allergies    No results found for any previous visit. Review of Systems   Musculoskeletal: Positive for back pain.        BP (!) 158/88 (Site: Right Upper Arm, Position: Sitting)   Pulse 69   Temp 98.9 °F (37.2 °C) (Oral)   Resp 16   Wt 239 lb (108.4 kg)   SpO2 97%   BMI 36.34 kg/m²     Physical Exam  Vitals signs reviewed. Constitutional:       Appearance: He is well-developed. HENT:      Head: Normocephalic and atraumatic. Eyes:      Pupils: Pupils are equal, round, and reactive to light. Neck:      Musculoskeletal: Normal range of motion. Cardiovascular:      Rate and Rhythm: Normal rate and regular rhythm. Heart sounds: Normal heart sounds. No murmur. Pulmonary:      Effort: Pulmonary effort is normal.      Breath sounds: Normal breath sounds. No wheezing. Abdominal:      General: Bowel sounds are normal.      Tenderness: There is no abdominal tenderness. Neurological:      Mental Status: He is alert and oriented to person, place, and time. Psychiatric:         Behavior: Behavior normal.         Thought Content: Thought content normal.         Judgment: Judgment normal.     Positive Straight Leg Test - right    Plan   Diagnosis Orders   1. Chronic right-sided low back pain without sciatica  methylPREDNISolone (MEDROL DOSEPACK) 4 MG tablet    tiZANidine (ZANAFLEX) 2 MG tablet    lidocaine-prilocaine (EMLA) 2.5-2.5 % cream       Pt will office in 1 week with status. Prior to Visit Medications    Medication Sig Taking? Authorizing Provider   methylPREDNISolone (MEDROL DOSEPACK) 4 MG tablet Take by mouth. Yes Kvng Arora, DO   tiZANidine (ZANAFLEX) 2 MG tablet Take 1 tablet by mouth 3 times daily as needed (prn) Yes Kvng Arora, DO   lidocaine-prilocaine (EMLA) 2.5-2.5 % cream Apply topically as needed.  Yes Kvng Arora, DO   atorvastatin (LIPITOR) 20 MG tablet TAKE 1 TABLET DAILY EVERY  NIGHT Yes Kvng Arora, DO   metoprolol tartrate (LOPRESSOR) 25 MG tablet TAKE 1 TABLET TWICE A DAY Yes Renae Pelaez, DO   tamsulosin (FLOMAX) 0.4 MG capsule TAKE ONE CAPSULE BY MOUTH DAILY Yes Kvng Arora, DO   nystatin (MYCOSTATIN) 084255 UNIT/GM cream Apply topically 2

## 2021-03-31 ENCOUNTER — TELEPHONE (OUTPATIENT)
Dept: FAMILY MEDICINE CLINIC | Age: 86
End: 2021-03-31

## 2021-03-31 DIAGNOSIS — G89.29 CHRONIC RIGHT-SIDED LOW BACK PAIN WITHOUT SCIATICA: Primary | ICD-10-CM

## 2021-03-31 DIAGNOSIS — M54.50 CHRONIC RIGHT-SIDED LOW BACK PAIN WITHOUT SCIATICA: Primary | ICD-10-CM

## 2021-03-31 NOTE — TELEPHONE ENCOUNTER
What will his insurance cover, as  I started to prescribe Diclofenac gel but saw that a PA is required as well? Thank you.

## 2021-03-31 NOTE — TELEPHONE ENCOUNTER
Please let pt know that I have prescribed Diclofenac gel instead as the EMLA wasn't covered. Thank you.

## 2021-04-01 NOTE — TELEPHONE ENCOUNTER
Lucita Schmitz from [employer] called needing to know how many grams the patient is to apply 2 x day of the Voltaren. Please advise.

## 2021-04-06 ENCOUNTER — OFFICE VISIT (OUTPATIENT)
Dept: FAMILY MEDICINE CLINIC | Age: 86
End: 2021-04-06
Payer: COMMERCIAL

## 2021-04-06 VITALS
RESPIRATION RATE: 16 BRPM | HEART RATE: 72 BPM | DIASTOLIC BLOOD PRESSURE: 88 MMHG | TEMPERATURE: 98.7 F | OXYGEN SATURATION: 91 % | WEIGHT: 235 LBS | BODY MASS INDEX: 35.73 KG/M2 | SYSTOLIC BLOOD PRESSURE: 138 MMHG

## 2021-04-06 DIAGNOSIS — G89.29 CHRONIC RIGHT-SIDED LOW BACK PAIN WITHOUT SCIATICA: Primary | ICD-10-CM

## 2021-04-06 DIAGNOSIS — M54.50 CHRONIC RIGHT-SIDED LOW BACK PAIN WITHOUT SCIATICA: Primary | ICD-10-CM

## 2021-04-06 PROCEDURE — 99213 OFFICE O/P EST LOW 20 MIN: CPT | Performed by: FAMILY MEDICINE

## 2021-04-06 ASSESSMENT — ENCOUNTER SYMPTOMS: BACK PAIN: 1

## 2021-04-06 NOTE — PROGRESS NOTES
4/6/2021    This is a 80 y.o. male   Chief Complaint   Patient presents with    Lower Back Pain     follow up - steroid helped some   . HPI  Presents today for follow-up for low back pain. At his last visit on March 30, 2020 he was prescribed Zanaflex 2 mg 3 times a day as needed and a Medrol Dosepak. States that he had OTC Voltaren gel already at home. Also states the gel helped and is still using, finished Medrol dose pack which helped. Overall feels better. Feels young. Past Medical History:   Diagnosis Date    Herniated cervical disc unknown       Past Surgical History:   Procedure Laterality Date    HERNIA REPAIR      PACEMAKER INSERTION  unknown    PROSTATE SURGERY         Social History     Socioeconomic History    Marital status:      Spouse name: Not on file    Number of children: Not on file    Years of education: Not on file    Highest education level: Not on file   Occupational History    Not on file   Social Needs    Financial resource strain: Not on file    Food insecurity     Worry: Not on file     Inability: Not on file    Transportation needs     Medical: Not on file     Non-medical: Not on file   Tobacco Use    Smoking status: Never Smoker    Smokeless tobacco: Never Used   Substance and Sexual Activity    Alcohol use:  Yes     Alcohol/week: 1.0 standard drinks     Types: 1 Cans of beer per week     Comment: drinks every day @ 4pm    Drug use: Never    Sexual activity: Not Currently   Lifestyle    Physical activity     Days per week: Not on file     Minutes per session: Not on file    Stress: Not on file   Relationships    Social connections     Talks on phone: Not on file     Gets together: Not on file     Attends Zoroastrian service: Not on file     Active member of club or organization: Not on file     Attends meetings of clubs or organizations: Not on file     Relationship status: Not on file    Intimate partner violence     Fear of current or ex partner: Not on file     Emotionally abused: Not on file     Physically abused: Not on file     Forced sexual activity: Not on file   Other Topics Concern    Not on file   Social History Narrative    Not on file       History reviewed. No pertinent family history. Current Outpatient Medications   Medication Sig Dispense Refill    diclofenac sodium (VOLTAREN) 1 % GEL Apply topically 2 times daily 28 g 0    tiZANidine (ZANAFLEX) 2 MG tablet Take 1 tablet by mouth 3 times daily as needed (prn) 30 tablet 0    lidocaine-prilocaine (EMLA) 2.5-2.5 % cream Apply topically as needed. 1 Tube 1    atorvastatin (LIPITOR) 20 MG tablet TAKE 1 TABLET DAILY EVERY  NIGHT 90 tablet 1    metoprolol tartrate (LOPRESSOR) 25 MG tablet TAKE 1 TABLET TWICE A  tablet 1    tamsulosin (FLOMAX) 0.4 MG capsule TAKE ONE CAPSULE BY MOUTH DAILY 90 capsule 1    nystatin (MYCOSTATIN) 653734 UNIT/GM cream Apply topically 2 times daily to gluteal folds 60 g 0    Turmeric 500 MG CAPS Take 500 mg by mouth      Cinnamon 500 MG CAPS Take 2,000 mg by mouth      aspirin 81 MG chewable tablet Take 81 mg by mouth      Alpha Lipoic Acid 200 MG CAPS Take 200 mg by mouth      Glucosamine-Chondroitin 250-200 MG TABS Take 2 tablets by mouth      senna-docusate (PERICOLACE) 8.6-50 MG per tablet Take 2 tablets by mouth      Chondroitin Sulfate 150 MG CAPS Take by mouth      Multiple Vitamin (MULTIVITAMIN) capsule Take 1 capsule by mouth daily       No current facility-administered medications for this visit. Immunization History   Administered Date(s) Administered    COVID-19, Pfizer, PF, 30mcg/0.3mL 02/27/2021, 03/20/2021       No Known Allergies    No results found for any previous visit. Review of Systems   Musculoskeletal: Positive for back pain (improved).        /88 (Site: Left Upper Arm, Position: Sitting)   Pulse 72   Temp 98.7 °F (37.1 °C) (Temporal)   Resp 16   Wt 235 lb (106.6 kg)   SpO2 91%   BMI 35.73 kg/m²

## 2021-05-07 RX ORDER — TAMSULOSIN HYDROCHLORIDE 0.4 MG/1
CAPSULE ORAL
Qty: 90 CAPSULE | Refills: 1 | Status: SHIPPED | OUTPATIENT
Start: 2021-05-07 | End: 2021-11-09

## 2021-05-07 RX ORDER — ATORVASTATIN CALCIUM 20 MG/1
TABLET, FILM COATED ORAL
Qty: 90 TABLET | Refills: 1 | Status: SHIPPED | OUTPATIENT
Start: 2021-05-07 | End: 2021-11-09

## 2021-05-10 RX ORDER — ATORVASTATIN CALCIUM 20 MG/1
TABLET, FILM COATED ORAL
Qty: 90 TABLET | Refills: 1 | OUTPATIENT
Start: 2021-05-10

## 2021-05-10 RX ORDER — TAMSULOSIN HYDROCHLORIDE 0.4 MG/1
CAPSULE ORAL
Qty: 90 CAPSULE | Refills: 1 | OUTPATIENT
Start: 2021-05-10

## 2021-05-10 NOTE — TELEPHONE ENCOUNTER
No future appointments. LOV lastapp    New Rx request for [facility]    Tried to call pt to make sure he requested this since he just had scripts filled at [facility]. Mailbox full.

## 2021-11-09 RX ORDER — TAMSULOSIN HYDROCHLORIDE 0.4 MG/1
CAPSULE ORAL
Qty: 90 CAPSULE | Refills: 1 | Status: SHIPPED | OUTPATIENT
Start: 2021-11-09 | End: 2022-05-13

## 2021-11-09 RX ORDER — ATORVASTATIN CALCIUM 20 MG/1
TABLET, FILM COATED ORAL
Qty: 90 TABLET | Refills: 1 | Status: SHIPPED | OUTPATIENT
Start: 2021-11-09 | End: 2022-05-13

## 2022-05-13 RX ORDER — ATORVASTATIN CALCIUM 20 MG/1
TABLET, FILM COATED ORAL
Qty: 90 TABLET | Refills: 1 | Status: SHIPPED | OUTPATIENT
Start: 2022-05-13

## 2022-05-13 RX ORDER — TAMSULOSIN HYDROCHLORIDE 0.4 MG/1
CAPSULE ORAL
Qty: 90 CAPSULE | Refills: 1 | Status: SHIPPED | OUTPATIENT
Start: 2022-05-13

## 2022-05-13 NOTE — TELEPHONE ENCOUNTER
Please let pt know that I refilled their medication, and that I'd like for them to schedule an appointment for an AWV. He's overdue, Thank you.

## 2022-05-16 NOTE — TELEPHONE ENCOUNTER
Spoke with patient. He said he's not coming here anymore. He said \"they assigned him some new dayna. \" He's not sure who it is, but he's going to find out. Instructed to call us back and schedule an appointment if he plans to continue to be Dr. Fabi Lora patient.

## 2022-06-21 DIAGNOSIS — I10 ESSENTIAL HYPERTENSION: ICD-10-CM

## 2022-06-21 DIAGNOSIS — Z12.5 PROSTATE CANCER SCREENING: Primary | ICD-10-CM

## 2022-11-09 RX ORDER — TAMSULOSIN HYDROCHLORIDE 0.4 MG/1
CAPSULE ORAL
Qty: 90 CAPSULE | Refills: 1 | OUTPATIENT
Start: 2022-11-09

## 2022-11-09 RX ORDER — ATORVASTATIN CALCIUM 20 MG/1
TABLET, FILM COATED ORAL
Qty: 90 TABLET | Refills: 1 | OUTPATIENT
Start: 2022-11-09
